# Patient Record
Sex: FEMALE | URBAN - NONMETROPOLITAN AREA
[De-identification: names, ages, dates, MRNs, and addresses within clinical notes are randomized per-mention and may not be internally consistent; named-entity substitution may affect disease eponyms.]

---

## 2023-07-19 ENCOUNTER — APPOINTMENT (OUTPATIENT)
Dept: URBAN - NONMETROPOLITAN AREA CLINIC 46 | Age: 53
Setting detail: DERMATOLOGY
End: 2023-07-24

## 2023-07-19 DIAGNOSIS — D22 MELANOCYTIC NEVI: ICD-10-CM

## 2023-07-19 PROBLEM — D22.39 MELANOCYTIC NEVI OF OTHER PARTS OF FACE: Status: ACTIVE | Noted: 2023-07-19

## 2023-07-19 PROCEDURE — OTHER COUNSELING: OTHER

## 2023-07-19 PROCEDURE — 99202 OFFICE O/P NEW SF 15 MIN: CPT

## 2023-07-19 PROCEDURE — OTHER MIPS QUALITY: OTHER

## 2023-07-19 ASSESSMENT — LOCATION DETAILED DESCRIPTION DERM
LOCATION DETAILED: RIGHT INFERIOR MEDIAL MALAR CHEEK
LOCATION DETAILED: LEFT INFERIOR MEDIAL MALAR CHEEK
LOCATION DETAILED: RIGHT MEDIAL MALAR CHEEK

## 2023-07-19 ASSESSMENT — LOCATION ZONE DERM: LOCATION ZONE: FACE

## 2023-07-19 ASSESSMENT — LOCATION SIMPLE DESCRIPTION DERM
LOCATION SIMPLE: LEFT CHEEK
LOCATION SIMPLE: RIGHT CHEEK

## 2024-06-21 ENCOUNTER — OFFICE VISIT (OUTPATIENT)
Dept: OBGYN CLINIC | Facility: CLINIC | Age: 54
End: 2024-06-21

## 2024-06-21 VITALS
HEART RATE: 61 BPM | HEIGHT: 60 IN | DIASTOLIC BLOOD PRESSURE: 77 MMHG | WEIGHT: 181 LBS | SYSTOLIC BLOOD PRESSURE: 125 MMHG | BODY MASS INDEX: 35.53 KG/M2

## 2024-06-21 DIAGNOSIS — Z01.419 NORMAL GYNECOLOGIC EXAMINATION: Primary | ICD-10-CM

## 2024-06-21 DIAGNOSIS — Z12.31 BREAST CANCER SCREENING BY MAMMOGRAM: ICD-10-CM

## 2024-06-21 DIAGNOSIS — N95.2 ATROPHY OF VAGINA: ICD-10-CM

## 2024-06-21 PROCEDURE — 3074F SYST BP LT 130 MM HG: CPT | Performed by: OBSTETRICS & GYNECOLOGY

## 2024-06-21 PROCEDURE — 3008F BODY MASS INDEX DOCD: CPT | Performed by: OBSTETRICS & GYNECOLOGY

## 2024-06-21 PROCEDURE — 3078F DIAST BP <80 MM HG: CPT | Performed by: OBSTETRICS & GYNECOLOGY

## 2024-06-21 PROCEDURE — 99396 PREV VISIT EST AGE 40-64: CPT | Performed by: OBSTETRICS & GYNECOLOGY

## 2024-06-21 RX ORDER — MEDROXYPROGESTERONE ACETATE 10 MG/1
10 TABLET ORAL DAILY
Qty: 14 TABLET | Refills: 0 | Status: SHIPPED | OUTPATIENT
Start: 2024-06-21 | End: 2024-07-05

## 2024-06-21 RX ORDER — ERGOCALCIFEROL 1.25 MG/1
CAPSULE ORAL
COMMUNITY
Start: 2024-06-16

## 2024-06-21 RX ORDER — ESTRADIOL 10 UG/1
10 INSERT VAGINAL DAILY
Qty: 14 TABLET | Refills: 0 | Status: SHIPPED | OUTPATIENT
Start: 2024-06-21 | End: 2024-07-21

## 2024-06-21 RX ORDER — IBUPROFEN 600 MG/1
TABLET ORAL
COMMUNITY
Start: 2024-05-20

## 2024-06-21 NOTE — PROGRESS NOTES
Paula Aguayo is a 53 year old female  No LMP recorded.   Chief Complaint   Patient presents with    Gyn Exam     Annual exam. Per patient having vaginal dryness   Pt annual exam. Menses none for 2 years.     OBSTETRICS HISTORY:     OB History    Para Term  AB Living   3 3 3     3   SAB IAB Ectopic Multiple Live Births                  # Outcome Date GA Lbr Papa/2nd Weight Sex Type Anes PTL Lv   3 Term      NORMAL SPONT      2 Term      NORMAL SPONT      1 Term      NORMAL SPONT          GYNE HISTORY:         No data recorded       No data to display                  MEDICAL HISTORY:     History reviewed. No pertinent past medical history.    SURGICAL HISTORY:     History reviewed. No pertinent surgical history.    SOCIAL HISTORY:     Social History     Socioeconomic History    Marital status:    Tobacco Use    Smoking status: Never    Smokeless tobacco: Never   Substance and Sexual Activity    Drug use: Not Currently        FAMILY HISTORY:     Family History   Problem Relation Age of Onset    Hypertension Father     Diabetes Father        MEDICATIONS:       Current Outpatient Medications:     ergocalciferol 1.25 MG (16470 UT) Oral Cap, , Disp: , Rfl:     Estradiol (VAGIFEM) 10 MCG Vaginal Tab, Place 10 mcg vaginally daily., Disp: 14 tablet, Rfl: 0    medroxyPROGESTERone Acetate (PROVERA) 10 MG Oral Tab, Take 1 tablet (10 mg total) by mouth daily for 14 days., Disp: 14 tablet, Rfl: 0    ibuprofen 600 MG Oral Tab, , Disp: , Rfl:     ALLERGIES:     Not on File      REVIEW OF SYSTEMS:     Constitutional:    denies fever / chills  Eyes:     denies blurred or double vision  Cardiovascular:  denies chest pain or palpitations  Respiratory:    denies shortness of breath  Gastrointestinal:  denies severe abdominal pain, frequent diarrhea or constipation, nausea / vomiting  Genitourinary:    denies dysuria, bothersome incontinence  Skin/Breast:   denies any breast pain, lumps, or  discharge  Neurological:    denies frequent severe headaches  Psychiatric:   denies depression or anxiety, thoughts of harming self or others  Heme/Lymph:    denies easy bruising or bleeding      PHYSICAL EXAM:   Blood pressure 125/77, pulse 61, height 5' (1.524 m), weight 181 lb (82.1 kg).  Constitutional:  well developed, well nourished  Head/Face:  normocephalic  Neck/Thyroid: thyroid symmetric, no thyromegaly, no nodules, no adenopathy  Lymphatic: no abnormal supraclavicular or axillary adenopathy is noted  Respiratory:      chest wall symmetric and nontender on palpation, clear to asculation bilateral, no wheezing, rales, ronchi, and resonance normal upon percussion  Cardiovascular: chest normal in appearance, regular rate and rhythm, no murmurs, PMI palpated midclavicular line  Breast:   normal bilaterally without palpable masses, tenderness, asymmetry, nipple discharge, nipple retraction or skin changes bilaterally  Abdomen:   soft, nontender, nondistended, no masses  Skin/Hair:  no unusual rashes or bruises  Extremities:  no edema, no cyanosis, non tender bilaterally  Psychiatric:   oriented to time, place, person and situation. Appropriate mood and affect    Pelvic Exam:  External Genitalia:  normal appearance, hair distribution, and no lesions  Urethral Meatus:   normal in size, location, without lesions   Bladder:    no fullness, masses or tenderness  Vagina:    normal appearance without lesions, no abnormal discharge, atrophy, easy to bleed, rugae atrophic  Cervix:    No lesions, normal friability   Uterus:    normal in size, 8 wk sized, normal contour, position, mobility, without  motion tenderness  Adnexa:   normal without masses or tenderness  Perineum:   normal  Anus: no hemorroids         ASSESSMENT & PLAN:     Paula was seen today for gyn exam.    Diagnoses and all orders for this visit:    Normal gynecologic examination  -     ThinPrep Pap with HPV Reflex, Chlamydia/GC; Future  -     Chlamydia/Gc  Amplification; Future  Nutrition, weight screening and exercise were discussed with the patient.  Exercise should encompass approximately 150 minutes/week.  Self breast exam counseling was also given.  I advised the patient to avoid tobacco, drugs and alcohol.  Influenza vaccine was offered if seasonally appropriate.  HPV and STD prevention counseling was given.  Health maintenance checklist  was reviewed including Pap smear, cervical cultures, and mammogram screening if age-appropriate.  Appropriate follow-up scheduling was discussed with the patient.      Atrophy of vagina  -     Estradiol (VAGIFEM) 10 MCG Vaginal Tab; Place 10 mcg vaginally daily.  -     medroxyPROGESTERone Acetate (PROVERA) 10 MG Oral Tab; Take 1 tablet (10 mg total) by mouth daily for 14 days.  Will rx for 14 days, and if not adequate will rx longer  Breast cancer screening by mammogram  -     [facility] BRENT 2D+3D SCREENING BILAT (CPT=77067/16975); Future          FOLLOW-UP     Return in about 1 year (around 6/21/2025) for Annual exam.      Genaro Bonilla MD  6/21/2024

## 2024-06-24 LAB
C TRACH DNA SPEC QL NAA+PROBE: NEGATIVE
N GONORRHOEA DNA SPEC QL NAA+PROBE: NEGATIVE

## 2024-06-26 LAB — HPV E6+E7 MRNA CVX QL NAA+PROBE: NEGATIVE

## (undated) NOTE — MR AVS SNAPSHOT
After Visit Summary   6/21/2024    Paula Aguayo   MRN: [id number]           Visit Information     Date & Time  6/21/2024  4:00 PM Provider  Genaro Bonilla MD [facility] - OB/GYN Dept. Phone  [phone]      Your Vitals Were  Most recent update: 6/21/2024  3:57 PM    BP   125/77    Pulse   61    Ht   60\"    Wt   181 lb    BMI   35.35 kg/m²         Allergies as of 6/21/2024  Review status set to Review Complete on 6/21/2024   Not on File     Your Current Medications        Dosage    ergocalciferol 1.25 MG (44616 UT) Oral Cap     Estradiol (VAGIFEM) 10 MCG Vaginal Tab Place 10 mcg vaginally daily.    medroxyPROGESTERone Acetate (PROVERA) 10 MG Oral Tab Take 1 tablet (10 mg total) by mouth daily for 14 days.    ibuprofen 600 MG Oral Tab       Diagnoses for This Visit    Normal gynecologic examination   [[id number]]  -  Primary  Atrophy of vagina   [[id number]]    Breast cancer screening by mammogram   [[id number]]             Follow-up    Return in about 1 year (around 6/21/2025) for Annual exam.     We Ordered the Following     Normal Orders This Visit    Chlamydia/Gc Amplification [[id number] CUSTOM]     ThinPrep PAP with HPV Reflex Request [[id number] CUSTOM]     ThinPrep Pap with HPV Reflex, Chlamydia/GC [[id number] CUSTOM]     Future Labs/Procedures Expected by Expires    Chlamydia/Gc Amplification [[id number] CUSTOM]  6/21/2024 (Approximate) 6/21/2025    [facility] BRENT 2D+3D SCREENING BILAT (CPT=77067/25929) [COMBO CPT(R)]  6/21/2024 (Approximate) 6/21/2025    ThinPrep Pap with HPV Reflex, Chlamydia/GC [[id number] CUSTOM]  6/21/2024 6/21/2025      Follow-up Instructions    Return in about 1 year (around 6/21/2025) for Annual exam.     Imaging Scheduling Instructions     Around June 21, 2024   Imaging:   [facility] BRENT 2D+3D SCREENING BILAT (CPT=77067/06284)    Instructions: Your order will generate a \"Scheduling Ticket\" that will be available in [employer] to schedule on your own at a  time most convenient to you.      If you do not have a [employer] Account, or if you prefer to speak with someone to schedule your appointment, please call [facility] Scheduling at [phone].         June 24, 2024      Paula Aguayo   [address] [facility] [address]     Dear Paula :    Thank you for enrolling in [employer]. Please follow the instructions below to securely access your online medical record. [employer] allows you to send messages to your doctor, view test results, renew prescriptions and request appointments.    How Do I Sign Up?  1. In your Internet browser, go to [email or web address]  2. Click on the Activate your Account if you have an activation code in the box under the *New User? section.   3. Enter your [employer] Activation Code exactly as it appears below. You will not need to use this code after you have completed the sign-up process. If you do not sign up before the expiration date, you must request a new code.    [employer] Activation Code: Activation code not generated      4. Enter your Zip Code and Date of Birth (mm/dd/yyyy) as indicated and click Next. You will be taken to the next sign-up page.  5. Create a [employer] Username. This will be your [employer] login Username and cannot be changed, so think of one that is secure and easy to remember.  6. Create a [employer] password. You can change your password at any time.  7. Choose a Security Question and enter your Answer and click Next. This can be used at a later time if you forget your password.   8. Enter your e-mail address. You will receive e-mail notification when new information is available in [employer].  9. Click Sign In. You can now view your medical record.    Additional Information  If you have questions, you can call [phone] to talk to our [employer] staff. Remember, [employer] is NOT to be used for urgent needs. For medical emergencies, dial 911.    Sincerely,    Genaro Bonilla MD              Did you  know that [facility] primary care physicians now offer Video Visits through [employer] for adult patients for a variety of conditions such as allergies, back pain and cold symptoms? Skip the drive and waiting room and online chat with a doctor face-to-face using your web-cam enabled computer or mobile device wherever you are. Video Visits cost $50 and can be paid hassle-free using a credit, debit, or health savings card.  Not active on [employer]? Ask us how to get signed up today!          If you receive a survey from [employer], please take a few minutes to complete it and provide feedback. We strive to deliver the best patient experience and are looking for ways to make improvements. Your feedback will help us do so. For more information on [employer], please visit [email or web address]           No text in SmartText           No text in SmartText